# Patient Record
Sex: FEMALE | Race: WHITE | URBAN - METROPOLITAN AREA
[De-identification: names, ages, dates, MRNs, and addresses within clinical notes are randomized per-mention and may not be internally consistent; named-entity substitution may affect disease eponyms.]

---

## 2017-04-13 ENCOUNTER — RECORDS - HEALTHEAST (OUTPATIENT)
Dept: LAB | Facility: CLINIC | Age: 57
End: 2017-04-13

## 2017-04-13 LAB
CHOLEST SERPL-MCNC: 198 MG/DL
FASTING STATUS PATIENT QL REPORTED: ABNORMAL
HDLC SERPL-MCNC: 46 MG/DL
LDLC SERPL CALC-MCNC: 119 MG/DL
TRIGL SERPL-MCNC: 167 MG/DL

## 2017-08-30 DIAGNOSIS — H53.10 SUBJECTIVE VISUAL DISTURBANCE: Primary | ICD-10-CM

## 2017-09-06 ENCOUNTER — OFFICE VISIT (OUTPATIENT)
Dept: OPHTHALMOLOGY | Facility: CLINIC | Age: 57
End: 2017-09-06

## 2017-09-06 DIAGNOSIS — H47.20 OPTIC ATROPHY: Primary | ICD-10-CM

## 2017-09-06 DIAGNOSIS — H53.10 SUBJECTIVE VISUAL DISTURBANCE: ICD-10-CM

## 2017-09-06 LAB
HCT VFR BLD AUTO: 41.8 % (ref 35–47)
VIT B12 SERPL-MCNC: 856 PG/ML (ref 193–986)

## 2017-09-06 PROCEDURE — 86038 ANTINUCLEAR ANTIBODIES: CPT | Performed by: OPHTHALMOLOGY

## 2017-09-06 PROCEDURE — 86780 TREPONEMA PALLIDUM: CPT | Performed by: OPHTHALMOLOGY

## 2017-09-06 ASSESSMENT — CUP TO DISC RATIO
OD_RATIO: 0.2
OS_RATIO: 0.2

## 2017-09-06 ASSESSMENT — SLIT LAMP EXAM - LIDS
COMMENTS: NORMAL
COMMENTS: NORMAL

## 2017-09-06 ASSESSMENT — EXTERNAL EXAM - LEFT EYE: OS_EXAM: NORMAL

## 2017-09-06 ASSESSMENT — REFRACTION_WEARINGRX
OD_CYLINDER: +0.50
OS_ADD: +3.00
OD_AXIS: 179
SPECS_TYPE: BIFOCAL
OD_ADD: +3.00
OS_SPHERE: -5.25
OS_AXIS: 168
OD_SPHERE: -6.00
OS_CYLINDER: +0.25

## 2017-09-06 ASSESSMENT — VISUAL ACUITY
CORRECTION_TYPE: GLASSES
METHOD: SNELLEN - LINEAR
OS_CC+: -3
OS_CC: 20/20
OD_CC+: -3
OD_CC: 20/20

## 2017-09-06 ASSESSMENT — EXTERNAL EXAM - RIGHT EYE: OD_EXAM: NORMAL

## 2017-09-06 ASSESSMENT — CONF VISUAL FIELD
OD_NORMAL: 1
METHOD: COUNTING FINGERS
OS_NORMAL: 1

## 2017-09-06 ASSESSMENT — TONOMETRY
IOP_METHOD: ICARE
OS_IOP_MMHG: 17
OD_IOP_MMHG: 17

## 2017-09-06 NOTE — MR AVS SNAPSHOT
After Visit Summary   2017    Rocío Noel    MRN: [id number]           Patient Information     Date Of Birth          1960        Visit Information        Provider Department      2017 12:30 PM Hema Nicole MD [facility] Ophthal[facility]        Today's Diagnoses     Optic atrophy    -  1    Subjective visual disturbance           Follow-ups after your visit        Follow-up notes from your care team     Return in about 4 months (around 2018) for Vision, color, tension, dilate, RNFL.      Your next 10 appointments already scheduled     Dawson 10, 2018 10:00 AM CST   (Arrive by 9:45 AM)   RETURN NEURO with Hema Nicole MD   [facility] ([facility] [place])    [address]  [address] [facility] [address] [phone]              Who to contact     Please call your clinic at [phone] to:    Ask questions about your health    Make or cancel appointments    Discuss your medicines    Learn about your test results    Speak to your doctor   If you have compliments or concerns about an experience at your clinic, or if you wish to file a complaint, please contact [facility] Patient Relations at [phone] or email us at [email or web address]         Additional Information About Your Visit        MyChart Information     TalkBint is an electronic gateway that provides easy, online access to your medical records. With [employer], you can request a clinic appointment, read your test results, renew a prescription or communicate with your care team.     To sign up for TalkBint visit the website at [email or web address]   You will be asked to enter the access code listed below, as well as some personal information. Please follow the directions to create your username and password.     Your access code is: [email or web address]  Expires: 2017  6:30 AM     Your access code will  in 90 days. If  you need help or a new code, please contact your [facility] or call [phone] for assistance.        Care EveryWhere ID     This is your Care EveryWhere ID. This could be used by other organizations to access your [place] medical records  [id number]         Blood Pressure from Last 3 Encounters:   05/22/08 149/86   11/15/07 143/84   08/01/07 142/85    Weight from Last 3 Encounters:   05/22/08 63.5 kg (140 lb)   11/15/07 60.3 kg (133 lb)   08/01/07 59 kg (130 lb)              We Performed the Following     Color Vision - Screening OU (both eyes)     IOP Measurement     OCT Optic Nerve RNFL Spectralis OU (both eyes)        Primary Care Provider Office Phone # Fax #    Armen Hopkins [phone]       [facility] [address] [address]        Equal Access to Services     WILBUR YOUNG : Hadii lori rodríguez hadasho Sotessieali, waaxda luqadaha, qaybta kaalmada adeegyada, waxay maríain hayvikram baez . So [facility] [phone].    ATENCIÓN: Si habla español, tiene a mejia disposición servicios gratuitos de asistencia lingüística. Ramon al [phone].    We comply with applicable federal civil rights laws and [place] laws. We do not discriminate on the basis of race, color, national origin, age, disability sex, sexual orientation or gender identity.            Thank you!     Thank you for choosing [facility] OPHTHALMOLOGY  for your care. Our goal is always to provide you with excellent care. Hearing back from our patients is one way we can continue to improve our services. Please take a few minutes to complete the written survey that you may receive in the mail after your visit with us. Thank you!             Your Updated Medication List - Protect others around you: Learn how to safely use, store and throw away your medicines at www.disposemymeds.org.          This list is accurate as of: 9/6/17  2:21 PM.  Always use your most recent med list.                    Brand Name Dispense Instructions for use Diagnosis    ALEVE 220 MG tablet   Generic drug:  naproxen sodium      1 TABLET EVERY 12 HOURS AS NEEDED        * COMPAZINE 10 MG tablet   Generic drug:  prochlorperazine      1 TABLET 3 TIMES DAILY AS NEEDED        * prochlorperazine 10 MG tablet    COMPAZINE     Take 1 tablet by mouth every 6 hours as needed. For nausea        CYMBALTA 60 MG EC capsule   Generic drug:  DULoxetine      Take 1 tablet by mouth daily.        LORazepam 1 MG tablet    ATIVAN     1 tablet every 4 hours as needed. infuse        * methotrexate 2.5 MG tablet CHEMO      4 TABLETS DAILY        * METHOTREXATE SODIUM IJ      0.8 mLs once a week. infuse        predniSONE 5 MG tablet    DELTASONE    60    1-2  TABLET IN THE MORNING WITH FOOD    Arthropathy, unspecified, site unspecified       traMADol 50 MG tablet    ULTRAM     Take 1 tablet by mouth 4 times daily as needed. For pain        traZODone 50 MG tablet    DESYREL     Take 1 tablet by mouth nightly as needed.        TYLENOL ARTHRITIS PAIN PO      2 TABLETS EVERY 8 HOURS AS NEEDED        * VICODIN 5-500 MG per tablet   Generic drug:  HYDROcodone-acetaminophen      1 TABLET EVERY 4 TO 6 HOURS AS NEEDED        * VICODIN 5-500 MG per tablet   Generic drug:  HYDROcodone-acetaminophen     90    1 tab orally bid-tid AS NEEDED FOR PAIN    Arthropathy, unspecified, site unspecified, Cervical osteoarthritis, DDD (degenerative disc disease), cervical       * Notice:  This list has 6 medication(s) that are the same as other medications prescribed for you. Read the directions carefully, and ask your doctor or other care provider to review them with you.

## 2017-09-06 NOTE — NURSING NOTE
Chief Complaints and History of Present Illnesses   Patient presents with     Consult For     visual disturbance     HPI    Affected eye(s):  Both   Symptoms:     Blurred vision   Double vision (Comment: patient notes monocular diplopia in the RE per pt x 2 months)   No floaters   No flashes         Do you have eye pain now?:  No      Comments:  Patient notes she gets HA about 2x a week    Sri Verduzco September 6, 2017 12:33 PM

## 2017-09-06 NOTE — LETTER
2017         RE:  :  MRN: Rocío Noel  1960  [id number]     Dear Dr. Ortiz,    Thank you for asking me to see your very pleasant patient, Rocío Noel, in neuro-ophthalmic consultation.  I would like to thank you for sending your records and I have summarized them in the history of present illness. She presented with her son in law who provided additional history.  My assessment and plan are below.  For further details, please see my attached clinic note.      Assessment & Plan     Rocío Noel is a 57 year old female with the following diagnoses:   1. Optic atrophy    2. Subjective visual disturbance       She has mild optic atrophy both eyes.  This could represent a new met from her breast cancer.  It could also be due to radiation induced optic neuropathy.  Will look at her MRI from [place].  I will also evaluate her nutrtionally and for infections with lab work-up.  Follow up 4 months sooner as needed for worsening symptoms to assess stability.  Will ask her to use artificial tear drops in the meantime.      Again, thank you for allowing me to participate in the care of your patient.      Sincerely,    Hema Nicole MD  Professor, Neuro-Ophthalmology  Department of Ophthalmology and Visual Neurosciences  [facility]    CC: See Ortiz MD  [facility]  [address]  [facility] [place] [address]  VIA Facsimile: [phone]       Armen Hopkins MD  [facility]  [address] [address]  VIA Facsimile: [phone]

## 2017-09-06 NOTE — PROGRESS NOTES
Assessment & Plan     Rocío Noel is a 57 year old female with the following diagnoses:   1. Optic atrophy    2. Subjective visual disturbance       She has mild optic atrophy both eyes.  This could represent a new met from her breast cancer.  It could also be due to radiation induced optic neuropathy.  Will look at her MRI from [place].  I will also evaluate her nutrtionally and for infections with lab work-up.  Follow up 4 months sooner as needed for worsening symptoms to assess stability.  Will ask her to use artificial tear drops in the meantime.           Attending Physician Attestation:  Complete documentation of historical and exam elements from today's encounter can be found in the full encounter summary report (not reduplicated in this progress note).  I personally obtained the chief complaint(s) and history of present illness.  I confirmed and edited as necessary the review of systems, past medical/surgical history, family history, social history, and examination findings as documented by others; and I examined the patient myself.  I personally reviewed the relevant tests, images, and reports as documented above.  I formulated and edited as necessary the assessment and plan and discussed the findings and management plan with the patient and family. - Hema Nicole MD

## 2017-09-07 LAB
ACE SERPL-CCNC: <5 U/L (ref 9–67)
ANA SER QL IF: NEGATIVE
FOLATE RBC-MCNC: 929 NG/ML
HCT VFR BLD CALC: 41.8 %
T PALLIDUM IGG+IGM SER QL: NEGATIVE

## 2017-09-09 LAB — VIT B1 BLD-MCNC: 119 NMOL/L (ref 70–180)

## 2017-09-11 DIAGNOSIS — H47.20 OPTIC ATROPHY: Primary | ICD-10-CM

## 2017-10-21 ENCOUNTER — HEALTH MAINTENANCE LETTER (OUTPATIENT)
Age: 57
End: 2017-10-21

## 2018-01-05 ENCOUNTER — RECORDS - HEALTHEAST (OUTPATIENT)
Dept: LAB | Facility: CLINIC | Age: 58
End: 2018-01-05

## 2018-01-05 LAB
ALBUMIN UR-MCNC: ABNORMAL MG/DL
APPEARANCE UR: ABNORMAL
BACTERIA #/AREA URNS HPF: ABNORMAL HPF
BILIRUB UR QL STRIP: NEGATIVE
COLOR UR AUTO: YELLOW
GLUCOSE UR STRIP-MCNC: NEGATIVE MG/DL
HGB UR QL STRIP: ABNORMAL
KETONES UR STRIP-MCNC: NEGATIVE MG/DL
LEUKOCYTE ESTERASE UR QL STRIP: ABNORMAL
MUCOUS THREADS #/AREA URNS LPF: ABNORMAL LPF
NITRATE UR QL: POSITIVE
PH UR STRIP: 6 [PH] (ref 4.5–8)
RBC #/AREA URNS AUTO: ABNORMAL HPF
SP GR UR STRIP: 1.03 (ref 1–1.03)
SQUAMOUS #/AREA URNS AUTO: ABNORMAL LPF
UROBILINOGEN UR STRIP-ACNC: ABNORMAL
WBC #/AREA URNS AUTO: >100 HPF
WBC CLUMPS #/AREA URNS HPF: PRESENT /[HPF]

## 2018-01-07 LAB — BACTERIA SPEC CULT: ABNORMAL

## 2018-01-09 DIAGNOSIS — H53.10 SUBJECTIVE VISION DISTURBANCE: Primary | ICD-10-CM

## 2018-03-20 DIAGNOSIS — H53.10 SUBJECTIVE VISUAL DISTURBANCE: Primary | ICD-10-CM

## 2018-03-21 ENCOUNTER — OFFICE VISIT (OUTPATIENT)
Dept: OPHTHALMOLOGY | Facility: CLINIC | Age: 58
End: 2018-03-21
Payer: MEDICARE

## 2018-03-21 DIAGNOSIS — H53.10 SUBJECTIVE VISUAL DISTURBANCE: ICD-10-CM

## 2018-03-21 DIAGNOSIS — H47.20 OPTIC ATROPHY: Primary | ICD-10-CM

## 2018-03-21 ASSESSMENT — REFRACTION_MANIFEST
OD_AXIS: 179
OS_ADD: +3.00
OD_CYLINDER: +0.50
OS_SPHERE: -5.75
OD_SPHERE: -6.00
OS_CYLINDER: +0.25
OS_AXIS: 168
OD_ADD: +3.00

## 2018-03-21 ASSESSMENT — REFRACTION_WEARINGRX
OS_AXIS: 168
OS_ADD: +3.00
OD_SPHERE: -6.00
OS_SPHERE: -5.25
OD_CYLINDER: +0.50
OD_AXIS: 179
SPECS_TYPE: BIFOCAL
OD_ADD: +3.00
OS_CYLINDER: +0.25

## 2018-03-21 ASSESSMENT — VISUAL ACUITY
OS_PH_CC: 20/25
CORRECTION_TYPE: GLASSES
METHOD: SNELLEN - LINEAR
OD_CC+: -2
OD_CC: 20/25
OS_CC: 20/40

## 2018-03-21 ASSESSMENT — CONF VISUAL FIELD
OD_NORMAL: 1
OS_NORMAL: 1
METHOD: COUNTING FINGERS

## 2018-03-21 ASSESSMENT — TONOMETRY
IOP_METHOD: ICARE
OS_IOP_MMHG: 11
OD_IOP_MMHG: 9

## 2018-03-21 ASSESSMENT — CUP TO DISC RATIO
OS_RATIO: 0.2
OD_RATIO: 0.2

## 2018-03-21 ASSESSMENT — EXTERNAL EXAM - RIGHT EYE: OD_EXAM: NORMAL

## 2018-03-21 ASSESSMENT — EXTERNAL EXAM - LEFT EYE: OS_EXAM: NORMAL

## 2018-03-21 ASSESSMENT — SLIT LAMP EXAM - LIDS
COMMENTS: NORMAL
COMMENTS: NORMAL

## 2018-03-21 NOTE — NURSING NOTE
Chief Complaints and History of Present Illnesses   Patient presents with     Follow Up For     Optic atrophy     HPI    Affected eye(s):  Both   Symptoms:     Difficulty with reading      Frequency:  Constant       Do you have eye pain now?:  Yes   Location:  OU   Pain Frequency:  Constant   Pain Characteristics:  Aching      Comments:  Pt states no vision changes for distance, pt feels she sees better after taking gls off to read. Pt has constant aching in both eyes, not using any eye drops.    Micha SALOMON 10:16 AM March 21, 2018

## 2018-03-21 NOTE — MR AVS SNAPSHOT
After Visit Summary   3/21/2018    Rocío Noel    MRN: [id number]           Patient Information     Date Of Birth          1960        Visit Information        Provider Department      3/21/2018 10:00 AM Hema Nicole MD [facility] Ophthalmology        Today's Diagnoses     Optic atrophy    -  1    Subjective visual disturbance           Follow-ups after your visit        Follow-up notes from your care team     Return in about 1 year (around 3/21/2019) for Vision, color, tension, dilate, RNFL, LVC.      Who to contact     Please call your clinic at [phone] to:    Ask questions about your health    Make or cancel appointments    Discuss your medicines    Learn about your test results    Speak to your doctor            Additional Information About Your Visit        [employer] are family too Information     [employer] gives you secure access to your electronic health record. If you see a primary care provider, you can also send messages to your care team and make appointments. If you have questions, please call your primary care clinic.  If you do not have a primary care provider, please call [phone] and they will assist you.      [employer] is an electronic gateway that provides easy, online access to your medical records. With [employer], you can request a clinic appointment, read your test results, renew a prescription or communicate with your care team.     To access your existing account, please contact your [facility] or call [phone] for assistance.        Care EveryWhere ID     This is your Care EveryWhere ID. This could be used by other organizations to access your [place] medical records  [id number]         Blood Pressure from Last 3 Encounters:   05/22/08 149/86   11/15/07 143/84   08/01/07 142/85    Weight from Last 3 Encounters:   05/22/08 63.5 kg (140 lb)   11/15/07 60.3 kg (133 lb)   08/01/07 59 kg (130 lb)              We Performed the Following      Color Vision - Screening OU (both eyes)     DILATED FUNDUS EXAM     IOP Measurement     OCT Optic Nerve RNFL Spectralis OU (both eyes)        Primary Care Provider Office Phone # Fax #    Armen Hopkins [phone]       [facility] [address] [address]        Equal Access to Services     WILBUR YOUNG : Hadii aad ku hadasho Soomaali, waaxda luqadaha, qaybta kaalmada adeegyada, waxay maríain hayskipn adetato shaquille labrian leiva. So [facility] [phone].    ATENCIÓN: Si habla español, tiene a mejia disposición servicios gratuitos de asistencia lingüística. [facility] [phone].    We comply with applicable federal civil rights laws and [place] laws. We do not discriminate on the basis of race, color, national origin, age, disability, sex, sexual orientation, or gender identity.            Thank you!     Thank you for choosing [facility] OPHTHALMOLOGY  for your care. Our goal is always to provide you with excellent care. Hearing back from our patients is one way we can continue to improve our services. Please take a few minutes to complete the written survey that you may receive in the mail after your visit with us. Thank you!             Your Updated Medication List - Protect others around you: Learn how to safely use, store and throw away your medicines at www.disposemymeds.org.          This list is accurate as of 3/21/18 11:42 AM.  Always use your most recent med list.                   Brand Name Dispense Instructions for use Diagnosis    ALEVE 220 MG tablet   Generic drug:  naproxen sodium      1 TABLET EVERY 12 HOURS AS NEEDED        * COMPAZINE 10 MG tablet   Generic drug:  prochlorperazine      1 TABLET 3 TIMES DAILY AS NEEDED        * prochlorperazine 10 MG tablet    COMPAZINE     Take 1 tablet by mouth every 6 hours as needed. For nausea        CYMBALTA 60 MG EC capsule   Generic drug:  DULoxetine      Take 1 tablet by mouth daily.        LORazepam 1 MG tablet    ATIVAN     1  tablet every 4 hours as needed. infuse        * methotrexate 2.5 MG tablet CHEMO      4 TABLETS DAILY        * METHOTREXATE SODIUM IJ      0.8 mLs once a week. infuse        predniSONE 5 MG tablet    DELTASONE    60    1-2  TABLET IN THE MORNING WITH FOOD    Arthropathy, unspecified, site unspecified       traMADol 50 MG tablet    ULTRAM     Take 1 tablet by mouth 4 times daily as needed. For pain        traZODone 50 MG tablet    DESYREL     Take 1 tablet by mouth nightly as needed.        TYLENOL ARTHRITIS PAIN PO      2 TABLETS EVERY 8 HOURS AS NEEDED        * VICODIN 5-500 MG per tablet   Generic drug:  HYDROcodone-acetaminophen      1 TABLET EVERY 4 TO 6 HOURS AS NEEDED        * VICODIN 5-500 MG per tablet   Generic drug:  HYDROcodone-acetaminophen     90    1 tab orally bid-tid AS NEEDED FOR PAIN    Arthropathy, unspecified, site unspecified, Cervical osteoarthritis, DDD (degenerative disc disease), cervical       * Notice:  This list has 6 medication(s) that are the same as other medications prescribed for you. Read the directions carefully, and ask your doctor or other care provider to review them with you.

## 2018-03-21 NOTE — PROGRESS NOTES
Assessment & Plan     Rocío Noel is a 57 year old female with the following diagnoses:   1. Optic atrophy    2. Subjective visual disturbance       Follow up optic atrophy both eyes.  Last visit was 6 months ago.  The patient states that her vision seems worse.    Examination shows visual acuity of 25 right eye 2040 left eye with correction.  With refraction her vision can be improved to 20/20 each eye.  Her R NFL appears the same, thinned in both eyes.  Her fundus examination remains unremarkable.    It is my impression that she has optic atrophy in both eyes.  There has been no progression in her visual acuity or nerve fiber layer.  I will give her a new prescription for glasses and follow-up with her in one year sooner if she if she develop any worsening of her vision.           Attending Physician Attestation:  Complete documentation of historical and exam elements from today's encounter can be found in the full encounter summary report (not reduplicated in this progress note).  I personally obtained the chief complaint(s) and history of present illness.  I confirmed and edited as necessary the review of systems, past medical/surgical history, family history, social history, and examination findings as documented by others; and I examined the patient myself.  I personally reviewed the relevant tests, images, and reports as documented above.  I formulated and edited as necessary the assessment and plan and discussed the findings and management plan with the patient and family. - Hema Nicole MD

## 2018-04-03 ENCOUNTER — RECORDS - HEALTHEAST (OUTPATIENT)
Dept: LAB | Facility: CLINIC | Age: 58
End: 2018-04-03

## 2018-04-03 LAB
ALBUMIN UR-MCNC: ABNORMAL MG/DL
APPEARANCE UR: ABNORMAL
BACTERIA #/AREA URNS HPF: ABNORMAL HPF
BILIRUB UR QL STRIP: NEGATIVE
COLOR UR AUTO: YELLOW
GLUCOSE UR STRIP-MCNC: NEGATIVE MG/DL
HGB UR QL STRIP: ABNORMAL
KETONES UR STRIP-MCNC: NEGATIVE MG/DL
LEUKOCYTE ESTERASE UR QL STRIP: ABNORMAL
NITRATE UR QL: POSITIVE
PH UR STRIP: 6.5 [PH] (ref 4.5–8)
RBC #/AREA URNS AUTO: ABNORMAL HPF
SP GR UR STRIP: 1.03 (ref 1–1.03)
SQUAMOUS #/AREA URNS AUTO: >100 LPF
UROBILINOGEN UR STRIP-ACNC: ABNORMAL
WBC #/AREA URNS AUTO: >100 HPF
WBC CLUMPS #/AREA URNS HPF: PRESENT /[HPF]

## 2018-04-05 LAB — BACTERIA SPEC CULT: ABNORMAL

## 2018-04-16 ENCOUNTER — RECORDS - HEALTHEAST (OUTPATIENT)
Dept: LAB | Facility: CLINIC | Age: 58
End: 2018-04-16

## 2018-04-16 LAB
ALBUMIN SERPL-MCNC: 3.4 G/DL (ref 3.5–5)
ALP SERPL-CCNC: 118 U/L (ref 45–120)
ALT SERPL W P-5'-P-CCNC: 32 U/L (ref 0–45)
ANION GAP SERPL CALCULATED.3IONS-SCNC: 12 MMOL/L (ref 5–18)
AST SERPL W P-5'-P-CCNC: 23 U/L (ref 0–40)
BASOPHILS # BLD AUTO: 0.1 THOU/UL (ref 0–0.2)
BASOPHILS NFR BLD AUTO: 1 % (ref 0–2)
BILIRUB SERPL-MCNC: 0.4 MG/DL (ref 0–1)
BUN SERPL-MCNC: 21 MG/DL (ref 8–22)
CALCIUM SERPL-MCNC: 9.3 MG/DL (ref 8.5–10.5)
CHLORIDE BLD-SCNC: 103 MMOL/L (ref 98–107)
CO2 SERPL-SCNC: 23 MMOL/L (ref 22–31)
CREAT SERPL-MCNC: 0.93 MG/DL (ref 0.6–1.1)
EOSINOPHIL # BLD AUTO: 0.3 THOU/UL (ref 0–0.4)
EOSINOPHIL NFR BLD AUTO: 4 % (ref 0–6)
ERYTHROCYTE [DISTWIDTH] IN BLOOD BY AUTOMATED COUNT: 13.1 % (ref 11–14.5)
FOLATE SERPL-MCNC: 15.3 NG/ML
GFR SERPL CREATININE-BSD FRML MDRD: >60 ML/MIN/1.73M2
GLUCOSE BLD-MCNC: 112 MG/DL (ref 70–125)
HCT VFR BLD AUTO: 45.6 % (ref 35–47)
HGB BLD-MCNC: 15 G/DL (ref 12–16)
LYMPHOCYTES # BLD AUTO: 2.5 THOU/UL (ref 0.8–4.4)
LYMPHOCYTES NFR BLD AUTO: 32 % (ref 20–40)
MCH RBC QN AUTO: 30.5 PG (ref 27–34)
MCHC RBC AUTO-ENTMCNC: 32.9 G/DL (ref 32–36)
MCV RBC AUTO: 93 FL (ref 80–100)
MONOCYTES # BLD AUTO: 0.7 THOU/UL (ref 0–0.9)
MONOCYTES NFR BLD AUTO: 9 % (ref 2–10)
NEUTROPHILS # BLD AUTO: 4.1 THOU/UL (ref 2–7.7)
NEUTROPHILS NFR BLD AUTO: 54 % (ref 50–70)
PLATELET # BLD AUTO: 387 THOU/UL (ref 140–440)
PMV BLD AUTO: 9 FL (ref 8.5–12.5)
POTASSIUM BLD-SCNC: 4.2 MMOL/L (ref 3.5–5)
PROT SERPL-MCNC: 7.3 G/DL (ref 6–8)
RBC # BLD AUTO: 4.91 MILL/UL (ref 3.8–5.4)
SODIUM SERPL-SCNC: 138 MMOL/L (ref 136–145)
TSH SERPL DL<=0.005 MIU/L-ACNC: 2.86 UIU/ML (ref 0.3–5)
VIT B12 SERPL-MCNC: 572 PG/ML (ref 213–816)
WBC: 7.6 THOU/UL (ref 4–11)

## 2018-04-17 LAB — 25(OH)D3 SERPL-MCNC: 42 NG/ML (ref 30–80)

## 2018-07-10 ENCOUNTER — RECORDS - HEALTHEAST (OUTPATIENT)
Dept: LAB | Facility: CLINIC | Age: 58
End: 2018-07-10

## 2018-07-10 LAB
ALBUMIN UR-MCNC: ABNORMAL MG/DL
APPEARANCE UR: ABNORMAL
BACTERIA #/AREA URNS HPF: ABNORMAL HPF
BILIRUB UR QL STRIP: NEGATIVE
COLOR UR AUTO: YELLOW
GLUCOSE UR STRIP-MCNC: NEGATIVE MG/DL
HGB UR QL STRIP: ABNORMAL
KETONES UR STRIP-MCNC: NEGATIVE MG/DL
LEUKOCYTE ESTERASE UR QL STRIP: ABNORMAL
MUCOUS THREADS #/AREA URNS LPF: ABNORMAL LPF
NITRATE UR QL: POSITIVE
PH UR STRIP: 5.5 [PH] (ref 4.5–8)
RBC #/AREA URNS AUTO: ABNORMAL HPF
SP GR UR STRIP: 1.02 (ref 1–1.03)
SQUAMOUS #/AREA URNS AUTO: ABNORMAL LPF
UROBILINOGEN UR STRIP-ACNC: ABNORMAL
WBC #/AREA URNS AUTO: >100 HPF
WBC CLUMPS #/AREA URNS HPF: PRESENT /[HPF]

## 2018-07-12 LAB — BACTERIA SPEC CULT: ABNORMAL

## 2018-11-23 ENCOUNTER — RECORDS - HEALTHEAST (OUTPATIENT)
Dept: LAB | Facility: CLINIC | Age: 58
End: 2018-11-23

## 2018-11-23 LAB
ALBUMIN SERPL-MCNC: 3.2 G/DL (ref 3.5–5)
ALP SERPL-CCNC: 94 U/L (ref 45–120)
ALT SERPL W P-5'-P-CCNC: 35 U/L (ref 0–45)
ANION GAP SERPL CALCULATED.3IONS-SCNC: 10 MMOL/L (ref 5–18)
AST SERPL W P-5'-P-CCNC: 25 U/L (ref 0–40)
BASOPHILS # BLD AUTO: 0.1 THOU/UL (ref 0–0.2)
BASOPHILS NFR BLD AUTO: 1 % (ref 0–2)
BILIRUB SERPL-MCNC: 0.2 MG/DL (ref 0–1)
BUN SERPL-MCNC: 24 MG/DL (ref 8–22)
CALCIUM SERPL-MCNC: 9.1 MG/DL (ref 8.5–10.5)
CARBAMAZEPINE SERPL-MCNC: <2 UG/ML (ref 4–12)
CHLORIDE BLD-SCNC: 106 MMOL/L (ref 98–107)
CO2 SERPL-SCNC: 25 MMOL/L (ref 22–31)
CREAT SERPL-MCNC: 0.78 MG/DL (ref 0.6–1.1)
EOSINOPHIL # BLD AUTO: 0.6 THOU/UL (ref 0–0.4)
EOSINOPHIL NFR BLD AUTO: 7 % (ref 0–6)
ERYTHROCYTE [DISTWIDTH] IN BLOOD BY AUTOMATED COUNT: 13.7 % (ref 11–14.5)
GFR SERPL CREATININE-BSD FRML MDRD: >60 ML/MIN/1.73M2
GLUCOSE BLD-MCNC: 82 MG/DL (ref 70–125)
HCT VFR BLD AUTO: 40.6 % (ref 35–47)
HGB BLD-MCNC: 13.3 G/DL (ref 12–16)
LYMPHOCYTES # BLD AUTO: 2.5 THOU/UL (ref 0.8–4.4)
LYMPHOCYTES NFR BLD AUTO: 29 % (ref 20–40)
MCH RBC QN AUTO: 30.1 PG (ref 27–34)
MCHC RBC AUTO-ENTMCNC: 32.8 G/DL (ref 32–36)
MCV RBC AUTO: 92 FL (ref 80–100)
MONOCYTES # BLD AUTO: 0.9 THOU/UL (ref 0–0.9)
MONOCYTES NFR BLD AUTO: 10 % (ref 2–10)
NEUTROPHILS # BLD AUTO: 4.5 THOU/UL (ref 2–7.7)
NEUTROPHILS NFR BLD AUTO: 53 % (ref 50–70)
PLATELET # BLD AUTO: 349 THOU/UL (ref 140–440)
PMV BLD AUTO: 8.9 FL (ref 8.5–12.5)
POTASSIUM BLD-SCNC: 3.8 MMOL/L (ref 3.5–5)
PROT SERPL-MCNC: 6.6 G/DL (ref 6–8)
RBC # BLD AUTO: 4.42 MILL/UL (ref 3.8–5.4)
SODIUM SERPL-SCNC: 141 MMOL/L (ref 136–145)
WBC: 8.6 THOU/UL (ref 4–11)

## 2019-08-06 ENCOUNTER — MEDICAL CORRESPONDENCE (OUTPATIENT)
Dept: HEALTH INFORMATION MANAGEMENT | Facility: CLINIC | Age: 59
End: 2019-08-06

## 2019-08-22 ENCOUNTER — MEDICAL CORRESPONDENCE (OUTPATIENT)
Dept: HEALTH INFORMATION MANAGEMENT | Facility: CLINIC | Age: 59
End: 2019-08-22

## 2019-11-03 ENCOUNTER — HEALTH MAINTENANCE LETTER (OUTPATIENT)
Age: 59
End: 2019-11-03

## 2020-01-01 ENCOUNTER — HEALTH MAINTENANCE LETTER (OUTPATIENT)
Age: 60
End: 2020-01-01

## 2021-04-03 ENCOUNTER — HEALTH MAINTENANCE LETTER (OUTPATIENT)
Age: 61
End: 2021-04-03